# Patient Record
Sex: FEMALE | Race: WHITE | NOT HISPANIC OR LATINO | Employment: UNEMPLOYED | URBAN - METROPOLITAN AREA
[De-identification: names, ages, dates, MRNs, and addresses within clinical notes are randomized per-mention and may not be internally consistent; named-entity substitution may affect disease eponyms.]

---

## 2020-10-01 ENCOUNTER — HOSPITAL ENCOUNTER (EMERGENCY)
Facility: HOSPITAL | Age: 30
Discharge: HOME | End: 2020-10-01
Attending: EMERGENCY MEDICINE
Payer: MEDICAID

## 2020-10-01 VITALS
OXYGEN SATURATION: 96 % | HEIGHT: 64 IN | HEART RATE: 66 BPM | WEIGHT: 260 LBS | SYSTOLIC BLOOD PRESSURE: 114 MMHG | RESPIRATION RATE: 18 BRPM | DIASTOLIC BLOOD PRESSURE: 90 MMHG | BODY MASS INDEX: 44.39 KG/M2

## 2020-10-01 DIAGNOSIS — T07.XXXA MULTIPLE CONTUSIONS: ICD-10-CM

## 2020-10-01 DIAGNOSIS — S80.211A ABRASION, KNEE, RIGHT, INITIAL ENCOUNTER: Primary | ICD-10-CM

## 2020-10-01 PROCEDURE — 99283 EMERGENCY DEPT VISIT LOW MDM: CPT

## 2020-10-01 PROCEDURE — 99284 PR EMERGENCY DEPT VISIT,LEVEL IV: ICD-10-PCS | Mod: ,,, | Performed by: EMERGENCY MEDICINE

## 2020-10-01 PROCEDURE — 25000003 PHARM REV CODE 250: Performed by: EMERGENCY MEDICINE

## 2020-10-01 PROCEDURE — 99284 EMERGENCY DEPT VISIT MOD MDM: CPT | Mod: ,,, | Performed by: EMERGENCY MEDICINE

## 2020-10-01 RX ORDER — NAPROXEN 375 MG/1
375 TABLET ORAL 2 TIMES DAILY PRN
Qty: 20 TABLET | Refills: 0 | Status: SHIPPED | OUTPATIENT
Start: 2020-10-01 | End: 2023-03-23

## 2020-10-01 RX ORDER — NAPROXEN 500 MG/1
500 TABLET ORAL
Status: COMPLETED | OUTPATIENT
Start: 2020-10-01 | End: 2020-10-01

## 2020-10-01 RX ADMIN — NAPROXEN 500 MG: 500 TABLET ORAL at 06:10

## 2020-10-01 NOTE — ED NOTES
Patient identifiers verified and correct for Ms Combs  C/C: Fall with pain to right side SEE NN  APPEARANCE: awake and alert in NAD.  SKIN: warm, dry and intact. No breakdown or bruising.  MUSCULOSKELETAL: Patient moving all extremities spontaneously, no obvious swelling or deformities noted. Ambulates independently.  RESPIRATORY: Denies shortness of breath.Respirations unlabored.   CARDIAC: Denies CP, 2+ distal pulses; no peripheral edema  ABDOMEN: S/ND/NT, Denies nausea  : voids spontaneously, denies difficulty  Neurologic: AAO x 4; follows commands equal strength in all extremities; denies numbness/tingling. Denies dizziness Denies weakness, Denies hitting her head, Denies LOC

## 2020-10-01 NOTE — ED PROVIDER NOTES
"SCRIBE #1 NOTE: I, Caleb Prescott, am scribing for, and in the presence of,  Jett Mcmahon MD. I have scribed the following portions of the note - Other sections scribed: HPI, ROS, PE.       Source of History:  Patient and medical records.    Chief complaint:  Fall (fell after tripping over a curb. No head trauma. Ambulatory on scene. )      HPI:  Sandra Combs is a 29 y.o. female presenting with a fall that happened about 30 minutes PTA. She states that she was crossing a street with her friend when she tripped and fell on her right side. Her entire right side hurts, starting from the right axilla and going all the way down to the right knee. The pain is exacerbated with movement. She states that the pain is the worst at the right hip and the right knee. She thinks she may have a "cut" in her right knee. She has a slight limp when she walks, but she is able to ambulate unassisted. Denies any trauma to the head. She declares no major medical problems and has NKDA.    ROS: As per HPI and below:  General: No fever.  No chills.  Eyes: No visual changes.  Head: No headache.    Integument: Abrasion in right knee  Chest: No shortness of breath.  Cardiovascular: No chest pain.  Abdomen: No abdominal pain.  No nausea or vomiting.  Urinary: No abnormal urination.  Neurologic: No focal weakness.  No numbness.  Hematologic: No easy bruising.  Musculoskeletal: See HPI  Endocrine: No excessive thirst or urination.    Review of patient's allergies indicates:  No Known Allergies    No current facility-administered medications on file prior to encounter.      No current outpatient medications on file prior to encounter.       PMH:  As per HPI and below:  History reviewed. No pertinent past medical history.  History reviewed. No pertinent surgical history.    Social History     Socioeconomic History    Marital status: Single     Spouse name: Not on file    Number of children: Not on file    Years of education: Not on file " "   Highest education level: Not on file   Occupational History    Not on file   Social Needs    Financial resource strain: Not on file    Food insecurity     Worry: Not on file     Inability: Not on file    Transportation needs     Medical: Not on file     Non-medical: Not on file   Tobacco Use    Smoking status: Never Smoker    Smokeless tobacco: Never Used   Substance and Sexual Activity    Alcohol use: Never     Frequency: Never    Drug use: Never    Sexual activity: Not on file   Lifestyle    Physical activity     Days per week: Not on file     Minutes per session: Not on file    Stress: Not on file   Relationships    Social connections     Talks on phone: Not on file     Gets together: Not on file     Attends Protestant service: Not on file     Active member of club or organization: Not on file     Attends meetings of clubs or organizations: Not on file     Relationship status: Not on file   Other Topics Concern    Not on file   Social History Narrative    Not on file       History reviewed. No pertinent family history.    Physical Exam:    Vitals:    10/01/20 1651   BP: (!) 114/90   Pulse: 66   Resp: 18   SpO2: 96%   Weight: 117.9 kg (260 lb)   Height: 5' 4" (1.626 m)     Appearance: No acute distress.  Head: Atraumatic.  Neck: No cervical spine tenderness.  Full range of motion.  Back: No thoracic, lumbar or sacral spine tenderness.  No soft tissue tenderness.  Chest: No chest wall tenderness.  Breath sounds are equal bilaterally.  No wheezes.  No rhonchi.  No rales.  Cardiovascular: Regular rate and rhythm.  No murmurs.  No gallops.  No rubs.  Abdomen: Soft. Nontender.   No distention.  No guarding. No rebound.  No ecchymoses.  Integument: Abrasion on the right knee.  Musculoskeletal: Good range of motion of all joints.  No deformities.  Mild right knee tenderness. Able to ambulate and bear weight on right knee. No significant bony tenderness.  Neurologic: Motor intact.  Sensation intact.  " Cranial nerves II through XII intact.  Cerebellar exam intact.  Mental status: Alert and oriented x 3.  GCS 15.    Medications Given:  Medications   naproxen tablet 500 mg (500 mg Oral Given 10/1/20 1846)       MDM:    29 y.o. female with right knee abrasion pain after a trip and fall on the street outside.  She is able ambulate without difficulty.  There was no other visible trauma, although she does complain of some soft tissue pain in her side.  Think this is all contusions.  Per the [place] knee rules, she does not need any knee x-rays. She does have an abrasion there which we will give her basic wound care instructions on.  Treated with NSAIDs for pain.  No indication for advanced imaging or blood work.  Okay to discharge.    Diagnostic Impression:    1. Abrasion, knee, right, initial encounter    2. Multiple contusions         ED Disposition Condition    Discharge Stable        ED Prescriptions     Medication Sig Dispense Start Date End Date Auth. Provider    naproxen (NAPROSYN) 375 MG tablet Take 1 tablet (375 mg total) by mouth 2 (two) times daily as needed (pain). 20 tablet 10/1/2020  Jett Mcmahon MD        Follow-up Information     Follow up With Specialties Details Why Contact Info    Your primary care doctor one week                                 Jett Mcmahon MD  10/01/20 2028

## 2023-03-23 ENCOUNTER — OFFICE VISIT (OUTPATIENT)
Dept: FAMILY MEDICINE | Facility: HOSPITAL | Age: 33
End: 2023-03-23
Payer: MEDICARE

## 2023-03-23 VITALS
HEART RATE: 68 BPM | DIASTOLIC BLOOD PRESSURE: 72 MMHG | WEIGHT: 253.06 LBS | BODY MASS INDEX: 43.2 KG/M2 | HEIGHT: 64 IN | SYSTOLIC BLOOD PRESSURE: 115 MMHG

## 2023-03-23 DIAGNOSIS — M62.838 MUSCLE SPASM: ICD-10-CM

## 2023-03-23 DIAGNOSIS — Z76.89 ENCOUNTER TO ESTABLISH CARE: ICD-10-CM

## 2023-03-23 DIAGNOSIS — F41.9 ANXIETY: ICD-10-CM

## 2023-03-23 DIAGNOSIS — Z11.4 ENCOUNTER FOR SCREENING FOR HIV: ICD-10-CM

## 2023-03-23 DIAGNOSIS — Z11.59 ENCOUNTER FOR HEPATITIS C SCREENING TEST FOR LOW RISK PATIENT: ICD-10-CM

## 2023-03-23 DIAGNOSIS — T78.40XA ALLERGY, INITIAL ENCOUNTER: ICD-10-CM

## 2023-03-23 DIAGNOSIS — K59.00 CONSTIPATION, UNSPECIFIED CONSTIPATION TYPE: Primary | ICD-10-CM

## 2023-03-23 PROCEDURE — 99214 OFFICE O/P EST MOD 30 MIN: CPT | Performed by: STUDENT IN AN ORGANIZED HEALTH CARE EDUCATION/TRAINING PROGRAM

## 2023-03-23 RX ORDER — BUSPIRONE HYDROCHLORIDE 10 MG/1
10 TABLET ORAL 2 TIMES DAILY
Qty: 180 TABLET | Refills: 3 | Status: SHIPPED | OUTPATIENT
Start: 2023-03-23 | End: 2023-08-14 | Stop reason: SDUPTHER

## 2023-03-23 RX ORDER — BUSPIRONE HYDROCHLORIDE 10 MG/1
10 TABLET ORAL 2 TIMES DAILY
COMMUNITY
Start: 2023-02-06 | End: 2023-03-23

## 2023-03-23 RX ORDER — CETIRIZINE HYDROCHLORIDE 10 MG/1
10 TABLET ORAL NIGHTLY
Qty: 90 TABLET | Refills: 3 | Status: SHIPPED | OUTPATIENT
Start: 2023-03-23 | End: 2023-08-14

## 2023-03-23 RX ORDER — TIZANIDINE 4 MG/1
4 TABLET ORAL NIGHTLY
Qty: 30 TABLET | Refills: 0 | Status: SHIPPED | OUTPATIENT
Start: 2023-03-23 | End: 2023-04-22

## 2023-03-23 RX ORDER — TIZANIDINE 4 MG/1
TABLET ORAL
COMMUNITY
Start: 2023-02-21 | End: 2023-03-23

## 2023-03-23 RX ORDER — POLYETHYLENE GLYCOL 3350 17 G/17G
17 POWDER, FOR SOLUTION ORAL DAILY
Qty: 30 EACH | Refills: 3 | Status: SHIPPED | OUTPATIENT
Start: 2023-03-23 | End: 2023-04-22

## 2023-03-23 NOTE — PROGRESS NOTES
[facility]  History & Physical    SUBJECTIVE:     Chief Complaint:   Chief Complaint   Patient presents with    Establish Care       History of Present Illness:  32 y.o. female who  has no past medical history on file. presents to clinic today for establishing care.      #Chronic constipation causing cramping abdominal pain  BM every few days  Tried Miralax for couple days  Denies hematachezia, f/c, n/v, weight loss, night sweats     #Allergies   Chronic over past few years throat irritations, congestion  Never tried otc antihistamines     #Anxiety  Adherent Buspar     #Mild scoliosis  Causes some pain, had been prescribed PT and Tizanidine that helped    #Dizziness  Occasional when she stands up, denies loc     Never had PAP, will schedule    Denies smoking, etoh, drug use    Recommend Influenza vaccinations.     Screening  Health Maintenance         Date Due Completion Date    Hepatitis C Screening Never done ---    Cervical Cancer Screening Never done ---    Lipid Panel Never done ---    COVID-19 Vaccine (1) Never done ---    HIV Screening Never done ---    TETANUS VACCINE Never done ---    Influenza Vaccine (1) Never done ---            There is no immunization history on file for this patient.  The ASCVD Risk score (Mahad RODRIGUEZ, et al., 2019) failed to calculate for the following reasons:    The 2019 ASCVD risk score is only valid for ages 40 to 79    Allergies:  Review of patient's allergies indicates:  No Known Allergies    Home Medications:  Current Outpatient Medications on File Prior to Visit   Medication Sig    [DISCONTINUED] busPIRone (BUSPAR) 10 MG tablet Take 10 mg by mouth 2 (two) times daily.    [DISCONTINUED] tiZANidine (ZANAFLEX) 4 MG tablet     [DISCONTINUED] naproxen (NAPROSYN) 375 MG tablet Take 1 tablet (375 mg total) by mouth 2 (two) times daily as needed (pain). (Patient not taking: Reported on 3/23/2023)     No current facility-administered medications on file prior to visit.        History reviewed. No pertinent past medical history.  History reviewed. No pertinent surgical history.  History reviewed. No pertinent family history.  Social History     Tobacco Use    Smoking status: Never    Smokeless tobacco: Never   Substance Use Topics    Alcohol use: Never    Drug use: Never            The patient's past medical history, surgical history, social history, family history, allergies and medications have been reviewed.    Review of Systems     10 point review of systems was conducted and only the pertinent positives and pertinent negatives are noted above in the HPI section.    OBJECTIVE:     Vital Signs:  Pulse: 68 (03/23/23 0930)  BP: 115/72 (03/23/23 0930)    Physical Exam:  Gen: Well nourished and developed, NAD  HEENT: normocephalic, atraumatic, PERRLA  Neck: trachea midline, no LAD  CV: RRR, S1 and S2 present, no murmurs, no LE edema, radial and dorsalis pedis pulses equal and present bilaterally  Resp: breathing comfortably on room air, CTAB, no wheezes or crackles  Abd: Non-distended, BSx4, non-tender  Skin: No rashes or abnormal skin lesions, no apparent jaundice or bruising  Neuro: A&Ox4, sensation intact throughout, spontaneous movements, gait smooth fernando wnl, UE and LE strength 5/5 bilaterally  MSK: Full ROM of all extremities  Psych: Logical thought process, answers questions appropriately    A/P:  Sandra was seen today for establish care.    Diagnoses and all orders for this visit:    Constipation, unspecified constipation type  If not resolved with miralax, try magnesium citrate or Fleet enema  -     polyethylene glycol (GLYCOLAX) 17 gram PwPk; Take 17 g by mouth once daily.    Encounter to establish care    BMI 40.0-44.9, adult  -     CBC Auto Differential; Future  -     Comprehensive Metabolic Panel; Future  -     Hemoglobin A1C; Future  -     Lipid Panel; Future  -     TSH; Future    Allergy, initial encounter  If not resolved with zyrtec, add nasal saline and  flonase  -     cetirizine (ZYRTEC) 10 MG tablet; Take 1 tablet (10 mg total) by mouth every evening.    Encounter for screening for HIV  -     HIV 1/2 Ag/Ab (4th Gen); Future    Encounter for hepatitis C screening test for low risk patient  -     Hepatitis C Antibody; Future    Muscle spasm  Patient reported scoliosis, either re-image or request medical records for reference   Discussed stretching and warm compresses, that muscle relaxers are not long term therapy   -     tiZANidine (ZANAFLEX) 4 MG tablet; Take 1 tablet (4 mg total) by mouth every evening.  -     Ambulatory referral/consult to Physical/Occupational Therapy; Future    Anxiety  Stable  -     busPIRone (BUSPAR) 10 MG tablet; Take 1 tablet (10 mg total) by mouth 2 (two) times daily.  -     Ambulatory referral/consult to Psychiatry; Future        Follow up in about 1 week (around 3/30/2023), or if symptoms worsen or fail to improve, for PAP.      Sarah Solano MD, [facility]  Family Medicine Physician  03/23/2023    I also recommend the following therapeutic lifestyle changes as an adjunct to medical therapy:     -Transition to a low salt, mediterranean-style diet which emphasizes:  Eating primarily plant-based foods, such as fruits and vegetables, whole grains, legumes and nuts   Replacing butter with healthy fats, such as olive oil   Using herbs and spices instead of salt to flavor foods   Limiting red meat to no more than a few times a month   Eating fish and poultry at least twice a week      -Initiation of a graded aerobic exercise plan (goal 30-45 minutes per day 4-5 times per week)     -A reasonable weight loss goal of 5-10% in 5-6 months.    The following information is provided to all patients.  This information is to help you find resources for any of the problems found today that may be affecting your health:                Living healthy guide: www.[facility].[place].gov       Understanding Diabetes: www.diabetes.org       Eating healthy:  www.cdc.gov/healthyweight      CDC home safety checklist: www.cdc.gov/steadi/patient.html      Agency on Aging: www.goea.[place].gov       Alcoholics anonymous (AA): www.aa.org      Physical Activity: www.kiran.nih.gov/lf2njdq       Tobacco use: www.quitwithusla.org

## 2023-05-12 DIAGNOSIS — T78.40XA ALLERGY, INITIAL ENCOUNTER: ICD-10-CM

## 2023-05-12 NOTE — TELEPHONE ENCOUNTER
Msg left for patient.    ----- Message from Ambar Giles sent at 5/12/2023  8:03 AM CDT -----  Regarding: refill and change medicine  Pt called to see if the Dr can put her on some other medication because this medication makes her feel nervous....busPIRone (BUSPAR) 10 MG and pt needs a refill on this medication..cetirizine (ZYRTEC) 10 MG tablet   call back # [phone].. She would like the prescription sent to [facility] pharmacy.

## 2023-05-15 RX ORDER — CETIRIZINE HYDROCHLORIDE 10 MG/1
10 TABLET ORAL NIGHTLY
Qty: 90 TABLET | Refills: 3 | OUTPATIENT
Start: 2023-05-15 | End: 2024-05-14

## 2023-05-22 ENCOUNTER — HOSPITAL ENCOUNTER (OUTPATIENT)
Dept: RADIOLOGY | Facility: HOSPITAL | Age: 33
Discharge: HOME | End: 2023-05-22
Attending: STUDENT IN AN ORGANIZED HEALTH CARE EDUCATION/TRAINING PROGRAM
Payer: MEDICARE

## 2023-05-22 ENCOUNTER — TELEPHONE (OUTPATIENT)
Dept: FAMILY MEDICINE | Facility: HOSPITAL | Age: 33
End: 2023-05-22

## 2023-05-22 ENCOUNTER — OFFICE VISIT (OUTPATIENT)
Dept: FAMILY MEDICINE | Facility: HOSPITAL | Age: 33
End: 2023-05-22
Payer: MEDICARE

## 2023-05-22 VITALS
SYSTOLIC BLOOD PRESSURE: 112 MMHG | WEIGHT: 257.25 LBS | BODY MASS INDEX: 43.92 KG/M2 | HEIGHT: 64 IN | DIASTOLIC BLOOD PRESSURE: 77 MMHG | HEART RATE: 70 BPM

## 2023-05-22 DIAGNOSIS — R10.12 LEFT UPPER QUADRANT ABDOMINAL PAIN: ICD-10-CM

## 2023-05-22 DIAGNOSIS — B37.2 CANDIDIASIS OF SKIN: ICD-10-CM

## 2023-05-22 DIAGNOSIS — F41.9 ANXIETY: ICD-10-CM

## 2023-05-22 DIAGNOSIS — K59.00 CONSTIPATION, UNSPECIFIED CONSTIPATION TYPE: Primary | ICD-10-CM

## 2023-05-22 DIAGNOSIS — K59.00 CONSTIPATION, UNSPECIFIED CONSTIPATION TYPE: ICD-10-CM

## 2023-05-22 PROCEDURE — 74018 XR ABDOMEN AP 1 VIEW: ICD-10-PCS | Mod: 26,,, | Performed by: RADIOLOGY

## 2023-05-22 PROCEDURE — 74018 RADEX ABDOMEN 1 VIEW: CPT | Mod: 26,,, | Performed by: RADIOLOGY

## 2023-05-22 PROCEDURE — 74018 RADEX ABDOMEN 1 VIEW: CPT | Mod: TC,FY

## 2023-05-22 PROCEDURE — 99213 OFFICE O/P EST LOW 20 MIN: CPT | Performed by: STUDENT IN AN ORGANIZED HEALTH CARE EDUCATION/TRAINING PROGRAM

## 2023-05-22 RX ORDER — ESCITALOPRAM OXALATE 10 MG/1
10 TABLET ORAL DAILY
Qty: 30 TABLET | Refills: 11 | Status: SHIPPED | OUTPATIENT
Start: 2023-05-22 | End: 2023-08-14 | Stop reason: SDUPTHER

## 2023-05-22 RX ORDER — DOXYLAMINE SUCCINATE 25 MG
TABLET ORAL 2 TIMES DAILY
Qty: 92 G | Refills: 0 | Status: SHIPPED | OUTPATIENT
Start: 2023-05-22 | End: 2023-06-05

## 2023-05-22 NOTE — PROGRESS NOTES
kSubjective:      Patient ID: Sandra Combs is a 32 y.o. female.    Chief Complaint: Abdominal Pain and Insomnia    HPI 31 yo f    #Abdominal pain  Patient seen 2 months ago for likely constipation, instructed to take mirilax   Patient did not do because she didn't think it would help   Reports early satiety 2-3 months  LBM few days ago, hard with straining      #Throat pain, difficulty swallowing liquid and solid  Sudden onset, feels there is swelling   TSH wnl    #Anxiety with Insomnia  Stopped taking Buspar 20 mg 2 months ago since it wasn't working  Interested in trying another medication and counseling   Denies SI/HI/AVH    #BMI 44  Labs 2 months ago unremarkable    Due Pap, pt to schedule    History reviewed. No pertinent past medical history.  Review of Systems     10 point review of systems was conducted and only the pertinent positives and pertinent negatives are noted above in the HPI section.    Objective:     Vitals:    05/22/23 1108   BP: 112/77   Pulse: 70     Physical Exam  Vitals reviewed.   Constitutional:       General: She is not in acute distress.     Appearance: Normal appearance. She is obese. She is not ill-appearing.   Eyes:      Conjunctiva/sclera: Conjunctivae normal.      Pupils: Pupils are equal, round, and reactive to light.   Neck:      Comments: No palpable thyroid mass or enlargement noted  Cardiovascular:      Rate and Rhythm: Normal rate.      Pulses: Normal pulses.   Pulmonary:      Effort: Pulmonary effort is normal.   Abdominal:      General: There is no distension.      Palpations: Abdomen is soft. There is no mass.      Tenderness: There is no guarding.   Musculoskeletal:         General: Tenderness (over lower ribcage left-sided) present.      Cervical back: Normal range of motion and neck supple. No rigidity or tenderness.   Skin:     Findings: Rash (candida under breast b/l) present.   Neurological:      General: No focal deficit present.      Mental Status: She is alert  and oriented to person, place, and time.   Psychiatric:         Mood and Affect: Mood normal.         Behavior: Behavior normal.     Assessment:      1. Constipation, unspecified constipation type    2. BMI 40.0-44.9, adult    3. Candidiasis of skin    4. Anxiety    5. Left upper quadrant abdominal pain      Plan:     Constipation, unspecified constipation type  -     X-Ray Abdomen AP 1 View; Future; Expected date: 05/22/2023  -     POCT Urine Pregnancy  -     linaCLOtide (LINZESS) 72 mcg Cap capsule; Take 1 capsule (72 mcg total) by mouth before breakfast. for 7 days  Dispense: 7 capsule; Refill: 0    BMI 40.0-44.9, adult    Candidiasis of skin  -     miconazole (MICOTIN) 2 % cream; Apply topically 2 (two) times daily. for 14 days  Dispense: 92 g; Refill: 0    Anxiety  CHLOE-7 score 20  PHQ-9 score 16  -     EScitalopram oxalate (LEXAPRO) 10 MG tablet; Take 1 tablet (10 mg total) by mouth once daily.  Dispense: 30 tablet; Refill: 11    Left upper quadrant abdominal pain  -     X-Ray Abdomen AP 1 View; Future; Expected date: 05/22/2023  -     POCT Urine Pregnancy      Follow up in about 6 weeks (around 7/3/2023), or if symptoms worsen or fail to improve, for follow up abd pain and lexapro.    Sarah Solano MD, [facility] Family Medicine PGY-3  05/22/2023

## 2023-05-22 NOTE — TELEPHONE ENCOUNTER
Called and spoke with patient regarding XR results     Impression:     Moderate stool retention.    Patient to start Linzess as prescribed.     Answered all questions, patient expressed understanding.     Sarah Solano MD, [facility] Family Medicine PGY-3  05/22/2023

## 2023-05-22 NOTE — TELEPHONE ENCOUNTER
----- Message from Bambi James sent at 5/22/2023 12:47 PM CDT -----  Delaney Lutz is calling pertaining to linaCLOtide (LINZESS) 72 mcg Cap capsule , they cant open the bottle because once they open the bottle it start the expiration process. They will have to dispense the whole 30 ct bottle. Needs approval. Call back [phone]

## 2023-05-23 NOTE — PROGRESS NOTES
I assume primary medical responsibility for this patient. I have reviewed the history, physical, and assessment & treatment plan with the resident and agree that the care is reasonable and necessary. This service has been performed by a resident without the presence of a teaching physician under the primary care exception. If necessary, an addendum of additional findings or evaluation beyond the resident documentation will be noted below.        Jus Shukla Jr., DO    [facility]

## 2023-07-10 ENCOUNTER — OFFICE VISIT (OUTPATIENT)
Dept: FAMILY MEDICINE | Facility: HOSPITAL | Age: 33
End: 2023-07-10
Payer: MEDICARE

## 2023-07-10 VITALS
HEIGHT: 64 IN | SYSTOLIC BLOOD PRESSURE: 120 MMHG | DIASTOLIC BLOOD PRESSURE: 82 MMHG | BODY MASS INDEX: 44.3 KG/M2 | WEIGHT: 259.5 LBS | HEART RATE: 81 BPM

## 2023-07-10 DIAGNOSIS — Z12.4 PAP SMEAR FOR CERVICAL CANCER SCREENING: ICD-10-CM

## 2023-07-10 DIAGNOSIS — Z20.2 ENCOUNTER FOR ASSESSMENT OF STD EXPOSURE: Primary | ICD-10-CM

## 2023-07-10 PROCEDURE — 99213 OFFICE O/P EST LOW 20 MIN: CPT

## 2023-07-11 NOTE — PROGRESS NOTES
I assume primary medical responsibility for this patient. I have reviewed the history, physical, and assessment & treatment plan with the resident and agree that the care is reasonable and necessary. This service has been performed by a resident without the presence of a teaching physician under the primary care exception. If necessary, an addendum of additional findings or evaluation beyond the resident documentation will be noted below.        Jus Shukla Jr., DO    [facility]

## 2023-07-11 NOTE — PROGRESS NOTES
[facility]  History & Physical    SUBJECTIVE:     Chief Complaint:   Chief Complaint   Patient presents with    Gynecologic Exam       History of Present Illness:  32 y.o. female who  has PMHx of Anxiety and Depression who presents to clinic today for pap smear. Patient last seen for [facility] care visit. Labs reviewed prior to current encounter unremarkable. Results discussed with patient during today's visit. Patient endorses vaginal itching and burning with urination since Febraury. Denies any fevers, chills, vaginal discharge, constipation or diarrhea.     Gyn history  Menarche: 13 y.o.  LMP June 30th. Endorses regular monthly cycles. Menses lasts 3-4 days. Per patient she uses overnight pads which she changes 10x per day.  PAP history: No prior paps done in the past.  Currently not sexually active. Usually sexually active with male partners.  No prior history of STD's. Hep C and HIV test done during recent visit negative.    OB history  G0    Social Hx: Denies any EtOH, Tobacco or illicit drug use. Currently lives at home with mother. Feels safe and happy at home.    Allergies:  Review of patient's allergies indicates:  No Known Allergies    Home Medications:  Current Outpatient Medications on File Prior to Visit   Medication Sig    busPIRone (BUSPAR) 10 MG tablet Take 1 tablet (10 mg total) by mouth 2 (two) times daily.    cetirizine (ZYRTEC) 10 MG tablet Take 1 tablet (10 mg total) by mouth every evening.    EScitalopram oxalate (LEXAPRO) 10 MG tablet Take 1 tablet (10 mg total) by mouth once daily.    miconazole (MICOTIN) 2 % cream Apply topically 2 (two) times daily. for 14 days     No current facility-administered medications on file prior to visit.       No past medical history on file.  No past surgical history on file.  No family history on file.  Social History     Tobacco Use    Smoking status: Never    Smokeless tobacco: Never   Substance Use Topics    Alcohol use: Never    Drug use:  Never        Review of Systems   Constitutional:  Negative for chills and fever.   HENT:  Negative for congestion and sore throat.    Eyes:  Negative for blurred vision.   Respiratory:  Negative for cough, shortness of breath and wheezing.    Cardiovascular:  Negative for chest pain and leg swelling.   Gastrointestinal:  Negative for abdominal pain, nausea and vomiting.   Genitourinary:  Positive for dysuria. Negative for frequency and urgency.   Musculoskeletal:  Negative for joint pain and myalgias.   Skin:  Positive for itching. Negative for rash.   Neurological:  Negative for dizziness and headaches.      OBJECTIVE:     Vital Signs:  Pulse: 81 (07/10/23 1546)  BP: 120/82 (07/10/23 1546)    Physical Exam  Constitutional:       Appearance: Normal appearance.   HENT:      Head: Normocephalic and atraumatic.      Mouth/Throat:      Pharynx: Oropharynx is clear.   Eyes:      Extraocular Movements: Extraocular movements intact.      Pupils: Pupils are equal, round, and reactive to light.   Cardiovascular:      Rate and Rhythm: Normal rate and regular rhythm.      Pulses: Normal pulses.      Heart sounds: Normal heart sounds.   Pulmonary:      Effort: Pulmonary effort is normal.      Breath sounds: Normal breath sounds.   Abdominal:      General: Abdomen is flat. Bowel sounds are normal.      Palpations: Abdomen is soft.   Genitourinary:     General: Normal vulva.      Vagina: No vaginal discharge.      Rectum: Normal.   Musculoskeletal:         General: Normal range of motion.      Cervical back: Normal range of motion and neck supple.   Skin:     General: Skin is warm and dry.   Neurological:      General: No focal deficit present.      Mental Status: She is alert and oriented to person, place, and time.   Psychiatric:         Mood and Affect: Mood normal.         Behavior: Behavior normal.     Pelvic Examination and Speculum Examination.  External genitalia wnl, no apparent masses or lesions, bimanual examination  performed and tolerated by the patient, no cervical motion tenderness. Speculum examination performed and tolerated by the patient. Cervical os closed and without obvious lesions. No abnormal vaginal discharge appreciated. Pap test obtained, pt tolerated procedure well. STI vaginal swabs obtained, pt tolerated procedure well.     Pelvic examination performed by Suraj Holland MD in the presence of a chaperone.     Laboratory:  Hemoglobin A1C   Date Value Ref Range Status   03/23/2023 4.8 4.0 - 5.6 % Final     Comment:     ADA Screening Guidelines:  5.7-6.4%  Consistent with prediabetes  >or=6.5%  Consistent with diabetes    High levels of fetal hemoglobin interfere with the HbA1C  assay. Heterozygous hemoglobin variants (HbS, HgC, etc)do  not significantly interfere with this assay.   However, presence of multiple variants may affect accuracy.         A/P:  Sandra was seen today for gynecologic exam.    Diagnoses and all orders for this visit:    Encounter for assessment of STD exposure  -     RPR; Future    Pap smear for cervical cancer screening        -Pap smear obtained. G & C and Vaginitis swab obtained.      Follow up if symptoms worsen or fail to improve.        Suraj Holland MD  [facility] Family Medicine, PGY-2  07/11/2023

## 2023-08-14 ENCOUNTER — OFFICE VISIT (OUTPATIENT)
Dept: FAMILY MEDICINE | Facility: HOSPITAL | Age: 33
End: 2023-08-14
Payer: MEDICARE

## 2023-08-14 VITALS
WEIGHT: 263.44 LBS | DIASTOLIC BLOOD PRESSURE: 72 MMHG | SYSTOLIC BLOOD PRESSURE: 110 MMHG | HEART RATE: 74 BPM | HEIGHT: 64 IN | BODY MASS INDEX: 44.97 KG/M2

## 2023-08-14 DIAGNOSIS — G89.29 CHRONIC BILATERAL LOW BACK PAIN WITHOUT SCIATICA: ICD-10-CM

## 2023-08-14 DIAGNOSIS — K21.00 GASTROESOPHAGEAL REFLUX DISEASE WITH ESOPHAGITIS WITHOUT HEMORRHAGE: ICD-10-CM

## 2023-08-14 DIAGNOSIS — F32.A DEPRESSION, UNSPECIFIED DEPRESSION TYPE: Primary | ICD-10-CM

## 2023-08-14 DIAGNOSIS — M54.50 CHRONIC BILATERAL LOW BACK PAIN WITHOUT SCIATICA: ICD-10-CM

## 2023-08-14 DIAGNOSIS — F41.9 ANXIETY: ICD-10-CM

## 2023-08-14 DIAGNOSIS — Z13.31 POSITIVE DEPRESSION SCREENING: ICD-10-CM

## 2023-08-14 PROCEDURE — 99214 OFFICE O/P EST MOD 30 MIN: CPT

## 2023-08-14 RX ORDER — BUSPIRONE HYDROCHLORIDE 10 MG/1
10 TABLET ORAL 2 TIMES DAILY
Qty: 180 TABLET | Refills: 1 | Status: SHIPPED | OUTPATIENT
Start: 2023-08-14 | End: 2024-03-15 | Stop reason: SDUPTHER

## 2023-08-14 RX ORDER — DICLOFENAC SODIUM 10 MG/G
2 GEL TOPICAL 4 TIMES DAILY PRN
Qty: 50 G | Refills: 2 | Status: SHIPPED | OUTPATIENT
Start: 2023-08-14

## 2023-08-14 RX ORDER — ESCITALOPRAM OXALATE 10 MG/1
10 TABLET ORAL DAILY
Qty: 90 TABLET | Refills: 1 | Status: SHIPPED | OUTPATIENT
Start: 2023-08-14 | End: 2024-03-15 | Stop reason: SDUPTHER

## 2023-08-14 RX ORDER — PANTOPRAZOLE SODIUM 20 MG/1
20 TABLET, DELAYED RELEASE ORAL DAILY
Qty: 90 TABLET | Refills: 1 | Status: SHIPPED | OUTPATIENT
Start: 2023-08-14 | End: 2024-02-10

## 2023-08-14 NOTE — PROGRESS NOTES
"    [facility] FAMILY PRACTICE CLINIC NOTE  Follow-up Visit      SUBJECTIVE:     Patient: Sandra Combs is a 32 y.o. female.    Chief Complaint:   Chief Complaint   Patient presents with    Sore Throat       History of Present Illness:  Pt c/o sore throat since February 2023, reports worsening  Denies heartburn  Endorses difficulty swallowing solid foods, will "cough up" when attempting to eat solids every time  Denies issues with swallowing soft foods or liquids    Hx of anxiety and depression  Self-stopped all psych medications including Lexapro 10mg and Buspar 10mg BID about 1 month ago  Reports she felt that the medication was ineffective and not helping her relax  Recently had house cleaned and thinks she lost pills or threw them away  Also states she does not like taking medications  Her friend passed away in January  Previously seen by counselor in [place], would like to restart counseling  CHLOE-7 total score 21 today  PHQ-9 total score 16 today    Hx of scoliosis and chronic back pain  Back pain R > L, lower  Would like to try PT and Voltaren gel      ROS  A 10+ review of systems was performed with pertinent positives and negatives noted above in the history of present illness. Other systems were negative unless otherwise specified.    OBJECTIVE:     Vital Signs (Most Recent)  Vitals:    08/14/23 1055   BP: 110/72   Pulse: 74   Weight: 119.5 kg (263 lb 7.2 oz)   Height: 5' 4" (1.626 m)     BMI: Body mass index is 45.22 kg/m².     Physical Exam:  Gen: No apparent distress, cooperative & interactive  CV: RRR, S1 and S2 present  Resp: CTAB, normal respiratory effort  MSK: Tenderness to palpation of lower back R > L, poor posture      ASSESSMENT:   Sandra Combs is a 32 y.o. female who presents to clinic to for    1. Depression, unspecified depression type    2. Positive depression screening    3. Anxiety    4. Gastroesophageal reflux disease with esophagitis without hemorrhage    5. Chronic bilateral low " back pain without sciatica         PLAN:     1. Positive depression screening  - Referred to Psychology    2. Anxiety  Refilled:  - busPIRone (BUSPAR) 10 MG tablet; Take 1 tablet (10 mg total) by mouth 2 (two) times daily.  Dispense: 180 tablet; Refill: 1  - EScitalopram oxalate (LEXAPRO) 10 MG tablet; Take 1 tablet (10 mg total) by mouth once daily.  Dispense: 90 tablet; Refill: 1    3. Depression, unspecified depression type  Refilled:  - EScitalopram oxalate (LEXAPRO) 10 MG tablet; Take 1 tablet (10 mg total) by mouth once daily.  Dispense: 90 tablet; Refill: 1  - Ambulatory referral/consult to Psychology; Future    4. Gastroesophageal reflux disease with esophagitis without hemorrhage  New Rx:  - pantoprazole (PROTONIX) 20 MG tablet; Take 1 tablet (20 mg total) by mouth once daily.  Dispense: 90 tablet; Refill: 1  - Consider swallow study or GI referral if trial of PPI ineffective    5. Chronic bilateral low back pain without sciatica  - Ambulatory referral/consult to Physical/Occupational Therapy; Future  Rx:  - diclofenac sodium (VOLTAREN) 1 % Gel; Apply 2 g topically 4 (four) times daily as needed.  Dispense: 50 g; Refill: 2      Provided patient with anticipatory guidance and return precautions. Treatment plan discussed with patient, all questions answered, and patient acknowledged understanding and verbal agreement.      Follow-up in: 2 months; or sooner PRN if acute concerns arise.    Case discussed with Dr. GUMARO Bosch MD, MPH  [facility] PGY-2  08/14/2023

## 2023-08-22 ENCOUNTER — TELEPHONE (OUTPATIENT)
Dept: PSYCHIATRY | Facility: HOSPITAL | Age: 33
End: 2023-08-22
Payer: MEDICARE

## 2023-08-22 NOTE — TELEPHONE ENCOUNTER
Left detailed message notifying patient on cell phone of results and instructions. -MP   Practitioner called for consultation. Patient did not answer. Practitioner left a message.

## 2023-08-26 NOTE — PROGRESS NOTES
I assume primary medical responsibility for this patient. I have reviewed the history, physical, and assessement & treatment plan with the resident and agree that the care is reasonable and necessary. This service has been performed by a resident without the presence of a teaching physician under the primary care exception. If necessary, an addendum of additional findings or evaluation beyond the resident documentation will be noted below.      Noelle Broderick MD    [facility]

## 2024-03-15 ENCOUNTER — OFFICE VISIT (OUTPATIENT)
Dept: FAMILY MEDICINE | Facility: HOSPITAL | Age: 34
End: 2024-03-15
Payer: MEDICARE

## 2024-03-15 VITALS
BODY MASS INDEX: 47.95 KG/M2 | WEIGHT: 280.88 LBS | DIASTOLIC BLOOD PRESSURE: 86 MMHG | HEIGHT: 64 IN | HEART RATE: 101 BPM | SYSTOLIC BLOOD PRESSURE: 117 MMHG

## 2024-03-15 DIAGNOSIS — F41.9 ANXIETY: ICD-10-CM

## 2024-03-15 DIAGNOSIS — E66.01 CLASS 3 SEVERE OBESITY WITH BODY MASS INDEX (BMI) OF 45.0 TO 49.9 IN ADULT, UNSPECIFIED OBESITY TYPE, UNSPECIFIED WHETHER SERIOUS COMORBIDITY PRESENT: Primary | ICD-10-CM

## 2024-03-15 DIAGNOSIS — F32.A DEPRESSION, UNSPECIFIED DEPRESSION TYPE: ICD-10-CM

## 2024-03-15 DIAGNOSIS — M54.50 CHRONIC BILATERAL LOW BACK PAIN WITHOUT SCIATICA: ICD-10-CM

## 2024-03-15 DIAGNOSIS — G89.29 CHRONIC BILATERAL LOW BACK PAIN WITHOUT SCIATICA: ICD-10-CM

## 2024-03-15 PROBLEM — E66.813 CLASS 3 SEVERE OBESITY WITH BODY MASS INDEX (BMI) OF 45.0 TO 49.9 IN ADULT: Status: ACTIVE | Noted: 2024-03-15

## 2024-03-15 PROCEDURE — 99214 OFFICE O/P EST MOD 30 MIN: CPT

## 2024-03-15 RX ORDER — BUSPIRONE HYDROCHLORIDE 10 MG/1
10 TABLET ORAL 2 TIMES DAILY
Qty: 120 TABLET | Refills: 0 | Status: SHIPPED | OUTPATIENT
Start: 2024-03-15 | End: 2024-05-17

## 2024-03-15 RX ORDER — ESCITALOPRAM OXALATE 10 MG/1
10 TABLET ORAL DAILY
Qty: 30 TABLET | Refills: 1 | Status: SHIPPED | OUTPATIENT
Start: 2024-03-15 | End: 2024-04-12 | Stop reason: SDUPTHER
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
## 2024-03-18 NOTE — PROGRESS NOTES
I assume primary medical responsibility for this patient. I have reviewed the history, physical, and assessment & treatment plan with the resident and agree that the care is reasonable and necessary. This service has been performed by a resident without the presence of a teaching physician under the primary care exception. If necessary, an addendum of additional findings or evaluation beyond the resident documentation will be noted below.        Jus Shukla Jr., DO    [facility]

## 2024-04-12 DIAGNOSIS — F32.A DEPRESSION, UNSPECIFIED DEPRESSION TYPE: ICD-10-CM

## 2024-04-12 DIAGNOSIS — F41.9 ANXIETY: ICD-10-CM

## 2024-04-12 RX ORDER — ESCITALOPRAM OXALATE 10 MG/1
10 TABLET ORAL DAILY
Qty: 30 TABLET | Refills: 1 | Status: SHIPPED | OUTPATIENT
Start: 2024-04-12 | End: 2024-06-11

## 2024-07-08 DIAGNOSIS — F32.A DEPRESSION, UNSPECIFIED DEPRESSION TYPE: ICD-10-CM

## 2024-07-08 DIAGNOSIS — F41.9 ANXIETY: ICD-10-CM

## 2024-07-08 RX ORDER — BUSPIRONE HYDROCHLORIDE 10 MG/1
10 TABLET ORAL 2 TIMES DAILY
Qty: 120 TABLET | Refills: 0 | Status: SHIPPED | OUTPATIENT
Start: 2024-07-08 | End: 2024-09-07

## 2024-07-08 RX ORDER — ESCITALOPRAM OXALATE 10 MG/1
10 TABLET ORAL DAILY
Qty: 30 TABLET | Refills: 1 | Status: SHIPPED | OUTPATIENT
Start: 2024-07-08 | End: 2024-09-06

## 2024-09-30 ENCOUNTER — TELEPHONE (OUTPATIENT)
Dept: FAMILY MEDICINE | Facility: HOSPITAL | Age: 34
End: 2024-09-30
Payer: MEDICARE

## 2024-10-05 NOTE — TELEPHONE ENCOUNTER
"Called pt back regarding side effects noted with Buspar and escitalopram.  She reports she stopped taking the medications in May or June 2024.  Endorses nausea and "weird vibes" with the mediations.  Advised pt to make follow up appointment with [facility] at her earliest convenience to address this issue.  She expressed understanding of plan.  All questions answered.         Kelli Bosch MD, MPH  [facility] PGY-3  09/30/2024    "

## 2024-10-10 ENCOUNTER — OFFICE VISIT (OUTPATIENT)
Dept: FAMILY MEDICINE | Facility: HOSPITAL | Age: 34
End: 2024-10-10
Payer: MEDICARE

## 2024-10-10 VITALS
HEIGHT: 64 IN | OXYGEN SATURATION: 99 % | WEIGHT: 287.69 LBS | DIASTOLIC BLOOD PRESSURE: 71 MMHG | BODY MASS INDEX: 49.11 KG/M2 | HEART RATE: 86 BPM | SYSTOLIC BLOOD PRESSURE: 117 MMHG

## 2024-10-10 DIAGNOSIS — Z30.9 ENCOUNTER FOR CONTRACEPTIVE MANAGEMENT, UNSPECIFIED TYPE: ICD-10-CM

## 2024-10-10 DIAGNOSIS — F32.A DEPRESSION, UNSPECIFIED DEPRESSION TYPE: Primary | ICD-10-CM

## 2024-10-10 DIAGNOSIS — F41.9 ANXIETY: ICD-10-CM

## 2024-10-10 LAB
B-HCG UR QL: NEGATIVE
CTP QC/QA: YES

## 2024-10-10 PROCEDURE — 99213 OFFICE O/P EST LOW 20 MIN: CPT

## 2024-10-10 RX ORDER — DROSPIRENONE 4 MG/1
4 TABLET, FILM COATED ORAL DAILY
Qty: 28 TABLET | Refills: 11 | Status: SHIPPED | OUTPATIENT
Start: 2024-10-10

## 2024-10-11 RX ORDER — BUSPIRONE HYDROCHLORIDE 10 MG/1
10 TABLET ORAL 2 TIMES DAILY
Qty: 120 TABLET | Refills: 0 | Status: SHIPPED | OUTPATIENT
Start: 2024-10-11 | End: 2024-12-10

## 2024-10-11 RX ORDER — DULOXETIN HYDROCHLORIDE 20 MG/1
20 CAPSULE, DELAYED RELEASE ORAL DAILY
Qty: 30 CAPSULE | Refills: 11 | Status: SHIPPED | OUTPATIENT
Start: 2024-10-11 | End: 2025-10-11

## 2024-10-11 NOTE — PROGRESS NOTES
"[facility] Family Medicine  History & Physical    SUBJECTIVE:     Chief Complaint:   Chief Complaint   Patient presents with    Medication Problem       History of Present Illness:  34 y.o. female who  has no past medical history on file. presents to clinic today for medication refill.  She is interested to restart anxiety/depression medications (no SI/HI) despite "weird feelings" from current regimen of Lexapro/Buspar which she stopped herself several months ago.  She is willing to try different medications. She is not on birth control, but is sexually active with men. Willing to start Slynd.   Up to date with PAP.     Allergies:  Review of patient's allergies indicates:  No Known Allergies    Home Medications:  Current Outpatient Medications on File Prior to Visit   Medication Sig    [DISCONTINUED] busPIRone (BUSPAR) 10 MG tablet Take 1 tablet (10 mg total) by mouth 2 (two) times daily.    [DISCONTINUED] diclofenac sodium (VOLTAREN) 1 % Gel Apply 2 g topically 4 (four) times daily as needed. (Patient not taking: Reported on 10/10/2024)    [DISCONTINUED] EScitalopram oxalate (LEXAPRO) 10 MG tablet Take 1 tablet (10 mg total) by mouth once daily.    [DISCONTINUED] miconazole (MICOTIN) 2 % cream Apply topically 2 (two) times daily. for 14 days    [DISCONTINUED] pantoprazole (PROTONIX) 20 MG tablet Take 1 tablet (20 mg total) by mouth once daily.     No current facility-administered medications on file prior to visit.       History reviewed. No pertinent past medical history.  History reviewed. No pertinent surgical history.  No family history on file.  Social History     Tobacco Use    Smoking status: Never    Smokeless tobacco: Never   Substance Use Topics    Alcohol use: Never    Drug use: Never        Review of Systems   Constitutional:  Negative for fever.   Respiratory:  Negative for shortness of breath.    Cardiovascular:  Negative for chest pain.   Psychiatric/Behavioral:  The patient is nervous/anxious.    All other " "systems reviewed and are negative.       OBJECTIVE:     Vital Signs:  Pulse: 86 (10/10/24 0951)  BP: 117/71 (10/10/24 0951)  SpO2: 99 % (10/10/24 0951)    Physical Exam  Constitutional:       Appearance: Normal appearance. She is obese.   HENT:      Head: Normocephalic and atraumatic.   Eyes:      Extraocular Movements: Extraocular movements intact.   Cardiovascular:      Rate and Rhythm: Normal rate and regular rhythm.      Pulses: Normal pulses.      Heart sounds: Normal heart sounds. No murmur heard.  Pulmonary:      Effort: Pulmonary effort is normal. No respiratory distress.      Breath sounds: Normal breath sounds. No stridor. No wheezing, rhonchi or rales.   Abdominal:      General: Abdomen is flat.      Palpations: Abdomen is soft.      Tenderness: There is no abdominal tenderness.   Musculoskeletal:      Right lower leg: No edema.      Left lower leg: No edema.   Neurological:      General: No focal deficit present.      Mental Status: She is alert and oriented to person, place, and time.   Psychiatric:      Comments: PHQ-9 = 21  CHLOE-7 = 20         A/P:  Sandra "Miss Flores" was seen today for medication problem.    Diagnoses and all orders for this visit:    Depression, unspecified depression type  Asymptomatic, no chest pain or palpitations  -     EKG 12-lead; Future    Encounter for contraceptive management, unspecified type  Sexually active with male partners, had used OCP in past  -     POCT Urine Pregnancy  -     drospirenone, contraceptive, (SLYND) 4 mg (28) Tab; Take 1 tablet (4 mg total) by mouth once daily.    Anxiety  PHQ-9 = 21  CHLOE-7 = 20  -     busPIRone (BUSPAR) 10 MG tablet; Take 1 tablet (10 mg total) by mouth 2 (two) times daily.  -     DULoxetine (CYMBALTA) 20 MG capsule; Take 1 capsule (20 mg total) by mouth once daily.        DUE AT NEXT/FUTURE VISIT:  1mo for medication s/e    Dimitri Marroquin  [facility], PGY-3  10/11/2024    This note was partially created using " [employer] Voice Recognition software. Typographical and content errors may occur with this process. While efforts are made to detect and correct such errors, in some cases errors will persist. For this reason, wording in this document should be considered in the proper context and not strictly verbatim     The following information is provided to all patients.  This information is to help you find resources for any of the problems found today that may be affecting your health:                Living healthy guide: www.[facility].[place].[facility]       Understanding Diabetes: www.diabetes.org       Eating healthy: www.cdc.gov/healthyweight      CDC home safety checklist: www.cdc.gov/steadi/patient.html      Agency on Aging: www.goea.[place].[facility]       Alcoholics anonymous (AA): www.aa.org      Physical Activity: www.kiran.nih.gov/gi5qpdn       Tobacco use: www.quitwithusla.org